# Patient Record
Sex: FEMALE
[De-identification: names, ages, dates, MRNs, and addresses within clinical notes are randomized per-mention and may not be internally consistent; named-entity substitution may affect disease eponyms.]

---

## 2017-03-23 ENCOUNTER — HOSPITAL (OUTPATIENT)
Dept: OTHER | Age: 32
End: 2017-03-23
Attending: OBSTETRICS & GYNECOLOGY

## 2017-03-23 LAB
ABS LYMPH: 2.6 K/CUMM (ref 1–3.5)
ABS MONO: 0.4 K/CUMM (ref 0.1–0.8)
ABS NEUTRO: 5 K/CUMM (ref 2–8)
BASOPHIL: 0 % (ref 0–1)
DIFF_TYPE?: NORMAL
EOSINOPHIL: 2 % (ref 0–6)
HBSAG: NON REACTIVE
HCT VFR BLD CALC: 40 % (ref 33–45)
HEMOLYSIS 4+: NEGATIVE
HEMOLYSIS 4+: NEGATIVE
HEP BS AG INSTR: 0.18
HGB BLD-MCNC: 13.3 G/DL (ref 11–15)
HIV 1 & 2 AB SERPL IA: NONREACTIVE
HIV 1,2 COMMENT: NORMAL
IMMATURE GRAN: 0.4 % (ref 0–0.3)
INSTR WBC: 8.3 K/CUMM (ref 4–11)
LYMPHOCYTE: 31 %
MCH RBC QN AUTO: 31 PG (ref 25–35)
MCHC RBC AUTO-ENTMCNC: 33 G/DL (ref 32–37)
MCV RBC AUTO: 93 FL (ref 78–97)
MONOCYTE: 5 %
NEUTROPHIL: 61 %
NRBC BLD MANUAL-RTO: 0 % (ref 0–0.2)
PLATELET: 245 K/CUMM (ref 150–450)
RBC # BLD: 4.35 M/CUMM (ref 3.7–5.2)
RDW: 12.1 % (ref 11.5–14.5)
RPR SER QL: NON REACTIVE
RUBELLA_IGG: 26.9 UNITS/ML
WBC # BLD: 8.3 K/CUMM (ref 4–11)

## 2017-03-30 ENCOUNTER — HOSPITAL (OUTPATIENT)
Dept: OTHER | Age: 32
End: 2017-03-30
Attending: ADVANCED PRACTICE MIDWIFE

## 2017-06-19 ENCOUNTER — HOSPITAL (OUTPATIENT)
Dept: OTHER | Age: 32
End: 2017-06-19
Attending: ADVANCED PRACTICE MIDWIFE

## 2017-10-12 ENCOUNTER — HOSPITAL (OUTPATIENT)
Dept: OTHER | Age: 32
End: 2017-10-12
Attending: OBSTETRICS & GYNECOLOGY

## 2017-10-21 ENCOUNTER — HOSPITAL (OUTPATIENT)
Dept: OTHER | Age: 32
End: 2017-10-21
Attending: OBSTETRICS & GYNECOLOGY

## 2017-10-21 LAB
ABS LYMPH: 2.8 K/CUMM (ref 1–3.5)
ABS MONO: 0.6 K/CUMM (ref 0.1–0.8)
ABS NEUTRO: 7.8 K/CUMM (ref 2–8)
BASOPHIL: 0 % (ref 0–1)
DIFF_TYPE?: ABNORMAL
EOSINOPHIL: 1 % (ref 0–6)
HCT VFR BLD CALC: 40 % (ref 33–45)
HGB BLD-MCNC: 13.5 G/DL (ref 11–15)
IMMATURE GRAN: 0.9 % (ref 0–0.3)
INSTR WBC: 11.4 K/CUMM (ref 4–11)
LYMPHOCYTE: 24 %
MCH RBC QN AUTO: 31 PG (ref 25–35)
MCHC RBC AUTO-ENTMCNC: 34 G/DL (ref 32–37)
MCV RBC AUTO: 91 FL (ref 78–97)
MONOCYTE: 6 %
NEUTROPHIL: 68 %
NRBC BLD MANUAL-RTO: 0 % (ref 0–0.2)
PLATELET: 152 K/CUMM (ref 150–450)
RBC # BLD: 4.34 M/CUMM (ref 3.7–5.2)
RDW: 13.1 % (ref 11.5–14.5)
SYPHILIS: NON REACTIVE
UA APPEAR: CLEAR
UA BILI: NEGATIVE
UA BLOOD: NEGATIVE
UA COLOR: YELLOW
UA GLUCOSE: NEGATIVE
UA KETONES: NEGATIVE
UA LEUK EST: NEGATIVE
UA NITRITE: NEGATIVE
UA PH: 6 (ref 5–7)
UA PROTEIN: NEGATIVE
UA SPEC GRAV: <=1.005 (ref 1.01–1.02)
UA UROBILINOGEN: 0.2 MG/DL (ref 0.2–1)
WBC # BLD: 11.4 K/CUMM (ref 4–11)

## 2017-10-22 LAB
ABG GLU: 57 MG/DL (ref 65–95)
ABG GLU: 94 MG/DL (ref 65–95)
ABG HCT: 55 % (ref 37–50)
ABG HCT: 58 % (ref 37–50)
ABG ION CALCIUM: 5.61 MG/DL (ref 4.5–5.3)
ABG ION CALCIUM: 6.01 MG/DL (ref 4.5–5.3)
ABG K: 4.61 MMOL/L (ref 3.5–5.3)
ABG K: 5.12 MMOL/L (ref 3.5–5.3)
ABG NA: 132.3 MMOL/L (ref 135–145)
ABG NA: 133.5 MMOL/L (ref 135–145)
ALLEN'S TEST: ABNORMAL
ALLEN'S TEST: ABNORMAL
ANALYZER: ABNORMAL
ANALYZER: ABNORMAL
BEVT: -1.6 MMOL/L (ref -6–0)
BEVT: -4.9 MMOL/L (ref -8–0)
COHB: 0.3 % (ref 0.5–1.5)
COHB: 0.8 % (ref 0.5–1.5)
DRAW SITE: ABNORMAL
DRAW SITE: ABNORMAL
HCO3: 23.5 MMOL/L (ref 19–26)
HCO3: 24.3 MMOL/L (ref 18–26)
METHB: 0.9 % (ref 0–1.5)
METHB: 1.5 % (ref 0–1.5)
O2 SAT(EST): 54.9 %
O2HB: 54 %
O2HB: 8.9 %
PCO2: 41.2 MMHG (ref 32–40)
PCO2: 60.3 MMHG (ref 42–50)
PH: 7.22 (ref 7.2–7.35)
PH: 7.37 (ref 7.25–7.4)
PO2: <43.2 MMHG
PO2: <43.2 MMHG
SAMPLE TYPE: ABNORMAL
SAMPLE TYPE: ABNORMAL
TECH ID: 6052
TECH ID: 6052

## 2017-10-23 LAB
ABS NEUTRO: 5.5 K/CUMM (ref 2–8)
HCT VFR BLD CALC: 36 % (ref 33–45)
HGB BLD-MCNC: 11.9 G/DL (ref 11–15)
INSTR WBC: 8 K/CUMM (ref 4–11)
MCH RBC QN AUTO: 31 PG (ref 25–35)
MCHC RBC AUTO-ENTMCNC: 33 G/DL (ref 32–37)
MCV RBC AUTO: 95 FL (ref 78–97)
NRBC BLD MANUAL-RTO: 0 % (ref 0–0.2)
PLATELET: 138 K/CUMM (ref 150–450)
RBC # BLD: 3.82 M/CUMM (ref 3.7–5.2)
RDW: 13.7 % (ref 11.5–14.5)
WBC # BLD: 8 K/CUMM (ref 4–11)

## 2018-01-10 ENCOUNTER — HOSPITAL (OUTPATIENT)
Dept: OTHER | Age: 33
End: 2018-01-10
Attending: OBSTETRICS & GYNECOLOGY

## 2018-01-10 LAB
REF LAB RESULT: NORMAL
REF TEST NAME: NORMAL

## 2018-03-20 ENCOUNTER — CHARTING TRANS (OUTPATIENT)
Dept: OTHER | Age: 33
End: 2018-03-20

## 2018-03-27 ENCOUNTER — CHARTING TRANS (OUTPATIENT)
Dept: OTHER | Age: 33
End: 2018-03-27

## 2018-11-01 VITALS
HEIGHT: 67 IN | RESPIRATION RATE: 16 BRPM | SYSTOLIC BLOOD PRESSURE: 114 MMHG | DIASTOLIC BLOOD PRESSURE: 62 MMHG | BODY MASS INDEX: 30.45 KG/M2 | WEIGHT: 194 LBS | TEMPERATURE: 97.9 F | HEART RATE: 56 BPM

## 2018-11-01 VITALS
HEIGHT: 67 IN | WEIGHT: 194.67 LBS | HEART RATE: 64 BPM | DIASTOLIC BLOOD PRESSURE: 71 MMHG | SYSTOLIC BLOOD PRESSURE: 109 MMHG | TEMPERATURE: 98 F | RESPIRATION RATE: 16 BRPM | BODY MASS INDEX: 30.55 KG/M2

## 2019-04-10 ENCOUNTER — OFFICE VISIT (OUTPATIENT)
Dept: FAMILY MEDICINE CLINIC | Facility: CLINIC | Age: 34
End: 2019-04-10
Payer: COMMERCIAL

## 2019-04-10 VITALS
DIASTOLIC BLOOD PRESSURE: 72 MMHG | TEMPERATURE: 98 F | HEART RATE: 56 BPM | OXYGEN SATURATION: 99 % | SYSTOLIC BLOOD PRESSURE: 104 MMHG | BODY MASS INDEX: 25.66 KG/M2 | WEIGHT: 163.5 LBS | HEIGHT: 67 IN

## 2019-04-10 DIAGNOSIS — R07.9 CHEST PAIN, UNSPECIFIED TYPE: Primary | ICD-10-CM

## 2019-04-10 DIAGNOSIS — K29.00 ACUTE GASTRITIS, PRESENCE OF BLEEDING UNSPECIFIED, UNSPECIFIED GASTRITIS TYPE: ICD-10-CM

## 2019-04-10 PROCEDURE — 99214 OFFICE O/P EST MOD 30 MIN: CPT | Performed by: NURSE PRACTITIONER

## 2019-04-10 PROCEDURE — 93000 ELECTROCARDIOGRAM COMPLETE: CPT | Performed by: NURSE PRACTITIONER

## 2019-04-10 NOTE — PATIENT INSTRUCTIONS
For your stomach avoid caffeine, alcohol, cigarettes, onions/peppers, spicy/acidic foods, and peppermint. Also eat small meals, do not eat before bedtime, also avoid ibuprofen/Aleve/aspirin.      Take Prilosec OTC once a day on empty stomach in the morning

## 2019-04-10 NOTE — PROGRESS NOTES
CHIEF COMPLAINT:   Patient presents with:  Abdominal Pain: upper  Chest Pressure      HPI:   Bhakti Morton is a 26year old female who presents to clinic today with complaints of feeling ill since Sunday (4/7) - overnight - epigastric pain and pain int labored. CARDIO: RRR without murmur  ABDOMEN: Slightly tender to epigastric area, no hepatosplenomegaly, slightly tender to right upper quadrant–negative James sign. Soft,  no guarding, no rebound, no masses, no hepatosplenomegaly.   EXTREMITIES: no cyan

## 2019-04-18 ENCOUNTER — OFFICE VISIT (OUTPATIENT)
Dept: FAMILY MEDICINE CLINIC | Facility: CLINIC | Age: 34
End: 2019-04-18
Payer: COMMERCIAL

## 2019-04-18 VITALS
DIASTOLIC BLOOD PRESSURE: 62 MMHG | HEIGHT: 67 IN | RESPIRATION RATE: 16 BRPM | TEMPERATURE: 98 F | HEART RATE: 66 BPM | WEIGHT: 160.63 LBS | SYSTOLIC BLOOD PRESSURE: 118 MMHG | BODY MASS INDEX: 25.21 KG/M2

## 2019-04-18 DIAGNOSIS — L23.5 ALLERGIC DERMATITIS DUE TO OTHER CHEMICAL PRODUCT: Primary | ICD-10-CM

## 2019-04-18 PROCEDURE — 99213 OFFICE O/P EST LOW 20 MIN: CPT | Performed by: NURSE PRACTITIONER

## 2019-04-18 NOTE — PROGRESS NOTES
HPI:    Patient ID: Rosalina Armstrong is a 26year old female. HPI      Patient tried using a new wrinkle cream around her eyes. After applying 2 nights ago, the next morning she woke up with swelling around her eyes.  Then in the evening noted a tingling Requested Prescriptions      No prescriptions requested or ordered in this encounter       Imaging & Referrals:  None      There are no Patient Instructions on file for this visit.        CQ#[id number]

## 2019-04-20 NOTE — PATIENT INSTRUCTIONS
Continue with the cool compresses. Continue with the Benadryl or another antihistamine. Do not use the product again. Follow-up if symptoms worsen or don't continue to improve.

## 2019-06-13 ENCOUNTER — OFFICE VISIT (OUTPATIENT)
Dept: FAMILY MEDICINE CLINIC | Facility: CLINIC | Age: 34
End: 2019-06-13
Payer: COMMERCIAL

## 2019-06-13 VITALS
HEIGHT: 67 IN | HEART RATE: 58 BPM | DIASTOLIC BLOOD PRESSURE: 70 MMHG | WEIGHT: 156 LBS | SYSTOLIC BLOOD PRESSURE: 100 MMHG | BODY MASS INDEX: 24.48 KG/M2 | OXYGEN SATURATION: 98 % | TEMPERATURE: 98 F

## 2019-06-13 DIAGNOSIS — J45.21 MILD INTERMITTENT REACTIVE AIRWAY DISEASE WITH ACUTE EXACERBATION: Primary | ICD-10-CM

## 2019-06-13 PROCEDURE — 99214 OFFICE O/P EST MOD 30 MIN: CPT | Performed by: NURSE PRACTITIONER

## 2019-06-13 RX ORDER — ALBUTEROL SULFATE 2.5 MG/3ML
2.5 SOLUTION RESPIRATORY (INHALATION) EVERY 4 HOURS PRN
Qty: 1 BOX | Refills: 1 | Status: SHIPPED | OUTPATIENT
Start: 2019-06-13

## 2019-06-13 RX ORDER — AZITHROMYCIN 250 MG/1
TABLET, FILM COATED ORAL
Qty: 6 TABLET | Refills: 0 | Status: SHIPPED | OUTPATIENT
Start: 2019-06-13 | End: 2019-06-20 | Stop reason: ALTCHOICE

## 2019-06-13 RX ORDER — PREDNISONE 20 MG/1
20 TABLET ORAL 2 TIMES DAILY
Qty: 10 TABLET | Refills: 0 | Status: SHIPPED | OUTPATIENT
Start: 2019-06-13 | End: 2019-06-18

## 2019-06-13 NOTE — PROGRESS NOTES
CHIEF COMPLAINT:   Patient presents with:  Cough: breast feeding      HPI:   Eugenia Price is a 26year old female who presents to clinic today with complaints of cough Sunday (6/9)- not much phlegm   Feels out of breath- coughing fits- worse at night. pharynx not erythematous or injected. No exudates. NECK: supple, non-tender  THYROID: Normal size, no nodules  LUNGS: Frequent, tight, bronchial cough–bronchospasm noted no wheezes. Breathing is non labored.   CARDIO: RRR without murmur  SKIN: no rashes,no

## 2019-06-13 NOTE — PATIENT INSTRUCTIONS
Rest, increase fluids, salt water gargles ,karol pot (use distilled water) or saline nasal spray, Mucinex-DM, Tylenol/Ibuprofen, follow up if symptoms persist or increase.

## 2019-06-20 ENCOUNTER — OFFICE VISIT (OUTPATIENT)
Dept: FAMILY MEDICINE CLINIC | Facility: CLINIC | Age: 34
End: 2019-06-20
Payer: COMMERCIAL

## 2019-06-20 ENCOUNTER — TELEPHONE (OUTPATIENT)
Dept: FAMILY MEDICINE CLINIC | Facility: CLINIC | Age: 34
End: 2019-06-20

## 2019-06-20 ENCOUNTER — HOSPITAL ENCOUNTER (OUTPATIENT)
Dept: GENERAL RADIOLOGY | Age: 34
Discharge: HOME | End: 2019-06-20
Attending: NURSE PRACTITIONER
Payer: COMMERCIAL

## 2019-06-20 VITALS
HEART RATE: 69 BPM | OXYGEN SATURATION: 99 % | SYSTOLIC BLOOD PRESSURE: 112 MMHG | BODY MASS INDEX: 24.48 KG/M2 | TEMPERATURE: 98 F | HEIGHT: 67 IN | RESPIRATION RATE: 18 BRPM | DIASTOLIC BLOOD PRESSURE: 70 MMHG | WEIGHT: 156 LBS

## 2019-06-20 DIAGNOSIS — R05.9 COUGH: Primary | ICD-10-CM

## 2019-06-20 DIAGNOSIS — R05.9 COUGH: ICD-10-CM

## 2019-06-20 DIAGNOSIS — J45.21 MILD INTERMITTENT REACTIVE AIRWAY DISEASE WITH ACUTE EXACERBATION: ICD-10-CM

## 2019-06-20 PROCEDURE — 99214 OFFICE O/P EST MOD 30 MIN: CPT | Performed by: NURSE PRACTITIONER

## 2019-06-20 PROCEDURE — 71046 X-RAY EXAM CHEST 2 VIEWS: CPT | Performed by: NURSE PRACTITIONER

## 2019-06-20 RX ORDER — PREDNISONE 20 MG/1
20 TABLET ORAL 2 TIMES DAILY
Qty: 10 TABLET | Refills: 0 | Status: SHIPPED | OUTPATIENT
Start: 2019-06-20 | End: 2019-06-25

## 2019-06-20 NOTE — TELEPHONE ENCOUNTER
----- Message from OFELIA Francis sent at 6/20/2019  3:12 PM CDT -----  Please notify the patient her chest xray is normal, no signs of pneumonia or other acute disease.

## 2019-06-20 NOTE — PATIENT INSTRUCTIONS
Chest xray today. Prednisone 20mg twice a day for five additional days. Continue albuterol nebulizers as needed. Notify us by Monday if no improvement in symptoms.

## 2019-06-20 NOTE — TELEPHONE ENCOUNTER
----- Message from Litzy Zhang, OFELIA sent at 6/20/2019  3:11 PM CDT -----  Please notify the patient her chest xray is normal, no signs of pneumonia or other acute disease.

## 2019-06-20 NOTE — PROGRESS NOTES
CHIEF COMPLAINT:   Patient presents with:  Cough: recheck on cough       HPI:   Uche Lisa is a 26year old female who presents to clinic today with complaints of cough. Reports mildly productive cough.   Did report some mild improvement, feels th dizziness, numbness, nor tingling in face.  See HPI    EXAM:   /70 (BP Location: Left arm, Patient Position: Sitting, Cuff Size: adult)   Pulse 69   Temp 98.2 °F (36.8 °C) (Tympanic)   Resp 18   Ht 67\"   Wt 156 lb   SpO2 99%   BMI 24.43 kg/m²   Wt Re plan.  Follow up with primary care provider in 2-3 days if symptoms do not improve or if symptoms worsen. Risk and benefits of medication discussed. Patient Instructions   Chest xray today. Prednisone 20mg twice a day for five additional days.    C

## 2019-11-13 ENCOUNTER — OFFICE VISIT (OUTPATIENT)
Dept: FAMILY MEDICINE CLINIC | Facility: CLINIC | Age: 34
End: 2019-11-13
Payer: COMMERCIAL

## 2019-11-13 VITALS
DIASTOLIC BLOOD PRESSURE: 64 MMHG | BODY MASS INDEX: 23 KG/M2 | TEMPERATURE: 98 F | RESPIRATION RATE: 16 BRPM | WEIGHT: 148.63 LBS | HEART RATE: 85 BPM | OXYGEN SATURATION: 98 % | SYSTOLIC BLOOD PRESSURE: 110 MMHG

## 2019-11-13 DIAGNOSIS — J31.0 RHINOSINUSITIS: Primary | ICD-10-CM

## 2019-11-13 DIAGNOSIS — J32.9 RHINOSINUSITIS: Primary | ICD-10-CM

## 2019-11-13 PROCEDURE — 99214 OFFICE O/P EST MOD 30 MIN: CPT | Performed by: NURSE PRACTITIONER

## 2019-11-13 RX ORDER — AMOXICILLIN AND CLAVULANATE POTASSIUM 875; 125 MG/1; MG/1
1 TABLET, FILM COATED ORAL 2 TIMES DAILY
Qty: 20 TABLET | Refills: 0 | Status: SHIPPED | OUTPATIENT
Start: 2019-11-13 | End: 2019-11-23

## 2019-11-13 NOTE — PROGRESS NOTES
HPI:    Patient ID: Adelina Diaz is a 37year old female. Patient presents to the clinic with complaints of cold symptoms began on Thursday, began to feel better but then began to feel worse again over the last couple days.  States she is feeling naus Cardiovascular: Normal rate and regular rhythm. Pulmonary/Chest: Effort normal and breath sounds normal. No respiratory distress. Musculoskeletal: Normal range of motion. Neurological: She is alert and oriented to person, place, and time.    Skin: Ski Talk to your pediatrician regarding the use of this medicine in children. Special care may be needed. What side effects may I notice from receiving this medicine?   Side effects that you should report to your doctor or health care professional as soon as p Tell your doctor or health care professional if your symptoms do not improve. Do not treat diarrhea with over the counter products. Contact your doctor if you have diarrhea that lasts more than 2 days or if it is severe and watery.   If you have diabetes,

## 2019-11-13 NOTE — PATIENT INSTRUCTIONS
Begin taking antibiotic  Probiotic  Take acetaminophen or ibuprofen for fever/discomfort  Drink plenty of fluids, warm liquids  Decongestants for congestion  Expectorant and/or cough suppressant  Use saline drops as needed or use neti pot/rinse (with disti tired  · white patches or sores in the mouth or throat  Side effects that usually do not require medical attention (report to your doctor or health care professional if they continue or are bothersome):  · diarrhea  · dizziness  · headache  · nausea, vomit your doctor, pharmacist, or health care provider.  Copyright© 2019 [employer]

## 2020-01-08 ENCOUNTER — OFFICE VISIT (OUTPATIENT)
Dept: FAMILY MEDICINE CLINIC | Facility: CLINIC | Age: 35
End: 2020-01-08
Payer: COMMERCIAL

## 2020-01-08 VITALS
RESPIRATION RATE: 16 BRPM | TEMPERATURE: 98 F | DIASTOLIC BLOOD PRESSURE: 64 MMHG | SYSTOLIC BLOOD PRESSURE: 110 MMHG | BODY MASS INDEX: 24.04 KG/M2 | OXYGEN SATURATION: 98 % | HEART RATE: 80 BPM | HEIGHT: 67 IN | WEIGHT: 153.19 LBS

## 2020-01-08 DIAGNOSIS — J40 SINOBRONCHITIS: Primary | ICD-10-CM

## 2020-01-08 DIAGNOSIS — J32.9 SINOBRONCHITIS: Primary | ICD-10-CM

## 2020-01-08 PROCEDURE — 99214 OFFICE O/P EST MOD 30 MIN: CPT | Performed by: FAMILY MEDICINE

## 2020-01-08 RX ORDER — AMOXICILLIN AND CLAVULANATE POTASSIUM 875; 125 MG/1; MG/1
1 TABLET, FILM COATED ORAL 2 TIMES DAILY
Qty: 14 TABLET | Refills: 0 | Status: SHIPPED | OUTPATIENT
Start: 2020-01-08 | End: 2020-01-15

## 2020-01-08 NOTE — PROGRESS NOTES
Chief Complaint:   Patient presents with:  Sinus Problem: Pt states that sinus pressure since yesterday      HPI:   This is a 37year old female coming in for acute illness with cold and cough last week now with sinus pressure. History of sinusitis.   Ronit allergic response,    EXAM:   /64 (BP Location: Right arm, Patient Position: Sitting, Cuff Size: adult)   Pulse 80   Temp 98.3 °F (36.8 °C) (Temporal)   Resp 16   Ht 67\"   Wt 153 lb 3.2 oz (69.5 kg)   LMP 12/23/2019   SpO2 98%   BMI 23.99 kg/m²  Est Active Problem List:     Bhargav Lundberg MD  1/8/2020  5:19 PM

## 2020-06-11 ENCOUNTER — TELEPHONE (OUTPATIENT)
Dept: FAMILY MEDICINE CLINIC | Facility: CLINIC | Age: 35
End: 2020-06-11

## 2020-06-11 NOTE — TELEPHONE ENCOUNTER
Patient informed of the below recommendations. Appt scheduled.      Future Appointments   Date Time Provider Rahul Dove   6/11/2020  3:15 PM OFELIA Trinh [facility]

## 2020-06-11 NOTE — TELEPHONE ENCOUNTER
She can come in this afternoon I believe the 215 is open–otherwise she can call the lactation consultant to discuss techniques for helping the breast.  If she has redness to the area, warmth, fever–she definitely needs to come in for an antibiotic for mast

## 2020-06-17 ENCOUNTER — OFFICE VISIT (OUTPATIENT)
Dept: FAMILY MEDICINE CLINIC | Facility: CLINIC | Age: 35
End: 2020-06-17
Payer: COMMERCIAL

## 2020-06-17 VITALS
HEART RATE: 61 BPM | SYSTOLIC BLOOD PRESSURE: 110 MMHG | DIASTOLIC BLOOD PRESSURE: 70 MMHG | WEIGHT: 150.63 LBS | TEMPERATURE: 99 F | BODY MASS INDEX: 23.64 KG/M2 | OXYGEN SATURATION: 99 % | HEIGHT: 67 IN | RESPIRATION RATE: 16 BRPM

## 2020-06-17 DIAGNOSIS — N64.4 BREAST PAIN: Primary | ICD-10-CM

## 2020-06-17 PROCEDURE — 99213 OFFICE O/P EST LOW 20 MIN: CPT | Performed by: NURSE PRACTITIONER

## 2020-06-17 NOTE — PROGRESS NOTES
Martha Medrano is a 37year old female. Patient presents with:  Pain: Left breast pain. Feels like a mass.        HPI:   Is nursing  2.5 yr old son  3 weeks ago had pain to left breast - then a week later - still had pain -   Saw lactation specialist- fee normal thyroid, no nodules  LUNGS: normal rate without respiratory distress, lungs clear to auscultation  CARDIO: RRR without murmur, no edema  BREAST: left breast- tenderness to lateral half of breast- several fibrocystic lesions palpable, no suspicious l

## 2020-06-17 NOTE — PATIENT INSTRUCTIONS
Take ibuprofen as needed for pain.  - also, you may try warm compress or ice -    Check ultrasound of left breast - call [facility] -at [phone]    Monitor area - follow up if symptoms persist or increase     Consider mammogram

## 2020-06-30 ENCOUNTER — TELEPHONE (OUTPATIENT)
Dept: FAMILY MEDICINE CLINIC | Facility: CLINIC | Age: 35
End: 2020-06-30

## 2020-06-30 NOTE — TELEPHONE ENCOUNTER
Patient states that last time she was seen was for breast pain, states that since then the pain went away and patients OB is not concerned. Pt states that she won't do the mammogram anymore.

## 2021-12-13 ENCOUNTER — TELEPHONE (OUTPATIENT)
Dept: FAMILY MEDICINE CLINIC | Facility: CLINIC | Age: 36
End: 2021-12-13

## 2021-12-13 DIAGNOSIS — R50.9 FEVER, UNSPECIFIED FEVER CAUSE: ICD-10-CM

## 2021-12-13 DIAGNOSIS — U07.1 LAB TEST POSITIVE FOR DETECTION OF COVID-19 VIRUS: Primary | ICD-10-CM

## 2021-12-13 DIAGNOSIS — R06.02 SHORTNESS OF BREATH: ICD-10-CM

## 2021-12-13 NOTE — TELEPHONE ENCOUNTER
I can order for monoclonal antibodies and have patient schedule for virtual visit for tomorrow for follow up. The monoclonal antibodies would be through the [facility], I don't have access to ordering at [facility].       Another option is to ch

## 2021-12-13 NOTE — TELEPHONE ENCOUNTER
Please call [facility] infusion center. Last I knew I wasn't able to send an order not being a [facility] provider. Please clarify.

## 2021-12-13 NOTE — TELEPHONE ENCOUNTER
Pts mother, Irlanda Rios, states that pt called [facility] to schedule for antibody infusion but they can't get her in until Wed.  States that pt needs to get it done sooner then Wed and was told to contact her PCP so they can send order to [facility]. Johnella Kayser

## 2021-12-13 NOTE — TELEPHONE ENCOUNTER
Mother called back to check status of infusion. [facility] told her she needed to go to [place].

## 2021-12-13 NOTE — TELEPHONE ENCOUNTER
Patient's mother Mariano Bay informed of he below recommendations. Mother will contact [facility] and c/b to let Nataliia Baez know what they decide.

## 2021-12-13 NOTE — TELEPHONE ENCOUNTER
Spoke to pts mother again and informed her that we can not send pts to [facility]-     They will not accept our pts at this time     pts mother states that pt is just going to go to ER and be evaluated and hopefully go about the process that way.     There is nothi

## 2021-12-13 NOTE — TELEPHONE ENCOUNTER
----- Message from Naomie Seip sent at 12/13/2021 11:14 AM CST -----  Mom returned call, pt is wanting to go to [facility] to get her infusion - stated [facility] was waiting for pt decision

## 2021-12-13 NOTE — TELEPHONE ENCOUNTER
Tested covid pos on a Home test   4 or 5 days ago    o2 has been in the 90s, very painful when shes breathing, very SOB, on going fever.      pts mother is calling because they were told that she would benefit from the antibody infusion & they are looking f

## 2021-12-13 NOTE — TELEPHONE ENCOUNTER
mom, anshu, has question about how to go about getting an antibody infustion- pt has covid and is getting worse but not enough to go to the ER